# Patient Record
Sex: MALE | Race: WHITE | NOT HISPANIC OR LATINO | Employment: OTHER | URBAN - METROPOLITAN AREA
[De-identification: names, ages, dates, MRNs, and addresses within clinical notes are randomized per-mention and may not be internally consistent; named-entity substitution may affect disease eponyms.]

---

## 2023-01-03 RX ORDER — ASCORBIC ACID 500 MG
500 TABLET ORAL
COMMUNITY

## 2023-01-03 RX ORDER — OMEPRAZOLE 20 MG/1
20 CAPSULE, DELAYED RELEASE ORAL
COMMUNITY
Start: 2022-08-22

## 2023-01-03 RX ORDER — ROSUVASTATIN CALCIUM 20 MG/1
20 TABLET, COATED ORAL
COMMUNITY
Start: 2022-06-01 | End: 2023-06-01

## 2023-01-03 RX ORDER — ASPIRIN 81 MG/1
81 TABLET ORAL
COMMUNITY

## 2023-01-03 RX ORDER — CILOSTAZOL 100 MG/1
100 TABLET ORAL 2 TIMES DAILY
COMMUNITY

## 2023-01-03 RX ORDER — FINASTERIDE 5 MG/1
5 TABLET, FILM COATED ORAL
COMMUNITY
Start: 2022-06-01

## 2023-01-03 RX ORDER — LOSARTAN POTASSIUM 50 MG/1
50 TABLET ORAL
COMMUNITY
Start: 2022-06-01 | End: 2023-06-01

## 2023-01-03 RX ORDER — GLUCOSAM/CHONDRO/HERB 149/HYAL 750-100 MG
1000 TABLET ORAL
COMMUNITY

## 2023-01-03 RX ORDER — TAMSULOSIN HYDROCHLORIDE 0.4 MG/1
0.4 CAPSULE ORAL
COMMUNITY
Start: 2022-06-01

## 2023-01-03 RX ORDER — AMLODIPINE BESYLATE 5 MG/1
5 TABLET ORAL
COMMUNITY
Start: 2022-06-01 | End: 2023-06-01

## 2023-01-03 RX ORDER — ACETAMINOPHEN 160 MG/5ML
200 SUSPENSION, ORAL (FINAL DOSE FORM) ORAL
COMMUNITY

## 2023-02-27 DIAGNOSIS — N18.31 STAGE 3A CHRONIC KIDNEY DISEASE: Primary | ICD-10-CM

## 2023-03-07 ENCOUNTER — OFFICE VISIT (OUTPATIENT)
Dept: NEPHROLOGY | Facility: CLINIC | Age: 68
End: 2023-03-07
Payer: MEDICARE

## 2023-03-07 VITALS
HEIGHT: 62 IN | WEIGHT: 135 LBS | BODY MASS INDEX: 24.84 KG/M2 | DIASTOLIC BLOOD PRESSURE: 63 MMHG | SYSTOLIC BLOOD PRESSURE: 110 MMHG | OXYGEN SATURATION: 95 % | HEART RATE: 79 BPM

## 2023-03-07 DIAGNOSIS — N18.31 STAGE 3A CHRONIC KIDNEY DISEASE: Primary | ICD-10-CM

## 2023-03-07 DIAGNOSIS — I10 PRIMARY HYPERTENSION: ICD-10-CM

## 2023-03-07 PROCEDURE — 99214 PR OFFICE/OUTPT VISIT, EST, LEVL IV, 30-39 MIN: ICD-10-PCS | Mod: ,,, | Performed by: INTERNAL MEDICINE

## 2023-03-07 PROCEDURE — 99214 OFFICE O/P EST MOD 30 MIN: CPT | Mod: ,,, | Performed by: INTERNAL MEDICINE

## 2023-03-07 RX ORDER — METOPROLOL SUCCINATE 25 MG/1
25 TABLET, EXTENDED RELEASE ORAL DAILY
COMMUNITY

## 2023-03-07 RX ORDER — DIPHENHYDRAMINE HCL 25 MG
25 CAPSULE ORAL DAILY
COMMUNITY

## 2023-03-07 NOTE — PROGRESS NOTES
Nephrology Clinic Note           Pt Name:  Lyle Hernandez  Pt :  1955  Pt MRN:  [id number]    Date: 3/7/2023  Provider: José Manuel Del Rio      Chief Complaint:   Chief Complaint   Patient presents with    Chronic Kidney Disease     Stage-3a, Cr-1.47, GFR-52       HPI:  Lyle Hernandez is a 67 y.o. male presenting for chronic kidney disease stage III.  Has high blood pressure, status post CVA, arthrosclerosis, history of NSAID usage, kidney stones, BPH, peripheral vascular disease, left renal artery stenosis.  The patient is coming here for the 3rd time. He was referred from primary doctor's for abnormal renal function.  Reviewing the chart he has had elevated creatinine at least since 2019.  History of diffuse arteriosclerotic disease-multiple angio graphic studies with several stents placement.  History of NSAID usage.  History of diabetes.  Since his last visit - no new medical events. Reports good bp at home.   Today in the office, no shortness of breath, no chest pain, no fever, no dysuria, no hematuria, no swelling of her legs, no diarrhea, or constipation.  Family history for father with renal cell carcinoma.  Kidney stones in the family.           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    Hypertension     Kidney stones     MI (myocardial infarction) 2018    Pacemaker     Vision abnormalities       Past Surgical History:   Procedure Laterality Date    ADENOIDECTOMY  1962    CARDIAC CATHETERIZATION      CARDIAC CATHETERIZATION      with stent    CARDIAC DEFIBRILLATOR PLACEMENT      CORONARY ARTERY BYPASS GRAFT      INSERT / REPLACE / REMOVE PACEMAKER      LITHOTRIPSY  2009    TONSILLECTOMY  1962     Family History   Problem Relation Age of Onset    Cancer Mother     Stroke Mother     Cancer Father     Diabetes Father     Kidney disease Father     Hypertension Father     Heart disease Father     Diabetes Brother      Social History     Substance and Sexual Activity   Alcohol Use Yes     Social History     Substance and Sexual Activity   Drug Use Not Currently     Social History     Substance and Sexual Activity   Sexual Activity Yes     reports being sexually active.  Social History     Tobacco Use   Smoking Status Never   Smokeless Tobacco Never       Allergies:  Review of patient's allergies indicates:  Not on File    [unfilled]       Physical Exam  Vitals:   There were no vitals filed for this visit.  There is no height or weight on file to calculate BMI.    Vital signs:   Wt Readings from Last 3 Encounters:   No data found for Wt     Temp Readings from Last 3 Encounters:   No data found for Temp     BP Readings from Last 3 Encounters:   No data found for BP     Pulse Readings from Last 3 Encounters:   No data found for Pulse       Physical Exam    GENERAL ASSESSMENT: awake and alert in no acute distress.  Eyes: anicteric sclera, no erythema or discharge.  HENT: Normocephalic, atraumatic, moist mucus membranes  Neck: Supple, no thyromegaly or lymphadenopathy   SKIN EXAM: no rash, ecchymosis.  Cardiovascular: regular rate and rhythm, S1 S2 heard.  No murmurs, rubs or gallops.  Respiratory: No rales, no wheezes or rhonchi  GI: BS+, NT, ND, no organomegaly.  : Not doan   MSK: No edema.  No joint stiffness, effusions  NEURO: alert and oriented,  PSYCH: answers questions appropriately, organized thinking    Nails: Normal clubbing, no pallor         Labs/Tests:  Lab Results   Component Value Date     07/07/2022    K 4.3 07/07/2022     07/07/2022    CO2 27 07/07/2022    BUN 22 07/07/2022    CREATININE 1.68 (H) 07/07/2022    CREATININE 1.55 (H) 03/03/2022    CREATININE 1.63 (H) 08/20/2021    GLUCOSE Negative 03/02/2023    CALCIUM 10.0 07/07/2022    PHOSPHORUS 3.3 03/03/2022    ALBUMIN 3.8 11/15/2022    EGFRNONAA 42 (L) 07/07/2022    EGFRNONAA 46 (L) 03/03/2022    EGFRNONAA 44 (L) 08/20/2021    ESTGFRAFRICA 49 (L) 07/07/2022    ESTGFRAFRICA 54 (L) 03/03/2022    ESTGFRAFRICA 51 (L) 08/20/2021    PTH 48 03/02/2023    HGB 14.2 03/02/2023    HGB 15.4 01/12/2023    HGB 15.2 03/03/2022    TRIG 54 01/12/2023    CHOL 91 01/12/2023    HDL 44 01/12/2023    LDLCALC 36 01/12/2023    LABVLDL 11 01/12/2023    FKNZLXCH14OS 80.8 03/02/2023         Renal US *CT scan recently showed renal artery stenosis.-Left kidney atrophic.     U/s of his kidneys - right kidney - 9.5 cm, left kidney - 7 cm, atrophic, no right renal artery stenosis. Left one with stenosis.   No stones described from the u/s.                                Impression:    Assessment & Plan:      Visit Diagnosis  No diagnosis found.      Plan    Essential hypertension-currently well controlled supposed to be on amlodipine, losartan, metoprolol 12.5 mg twice a day.  History of peripheral vascular disease with extensive atherosclerosis-status post several angiograms with stents placement.  Talk to the patient and the risks associated with angiograms.  Chronic kidney disease stage III-looks like kidney function has been stable, risk factors associated with kidney disease and progression discussed at length with the patient.  Fto follow up with his PCP  Possible renal artery stenosis from recent CT scanning with atrophic left kidney.  Kidney stones-no recent attack  NSAID usage-the side effects discussed at length with the patient.        Orders this visit   No  orders of the defined types were placed in this encounter.         Follow Up:   No follow-ups on file.    José Manuel Del Rio M.D.  Nephrology  03/07/2023  4:27 PM